# Patient Record
Sex: FEMALE | Race: WHITE
[De-identification: names, ages, dates, MRNs, and addresses within clinical notes are randomized per-mention and may not be internally consistent; named-entity substitution may affect disease eponyms.]

---

## 2018-12-20 ENCOUNTER — HOSPITAL ENCOUNTER (EMERGENCY)
Dept: HOSPITAL 84 - D.ER | Age: 58
LOS: 1 days | Discharge: HOME | End: 2018-12-21
Payer: MEDICAID

## 2018-12-20 VITALS — DIASTOLIC BLOOD PRESSURE: 65 MMHG | SYSTOLIC BLOOD PRESSURE: 116 MMHG

## 2018-12-20 VITALS — HEIGHT: 62 IN | WEIGHT: 204.43 LBS | BODY MASS INDEX: 37.62 KG/M2

## 2018-12-20 DIAGNOSIS — E11.9: ICD-10-CM

## 2018-12-20 DIAGNOSIS — G40.909: ICD-10-CM

## 2018-12-20 DIAGNOSIS — E87.6: ICD-10-CM

## 2018-12-20 DIAGNOSIS — F41.9: ICD-10-CM

## 2018-12-20 DIAGNOSIS — E03.9: ICD-10-CM

## 2018-12-20 DIAGNOSIS — N39.0: ICD-10-CM

## 2018-12-20 DIAGNOSIS — F17.200: ICD-10-CM

## 2018-12-20 DIAGNOSIS — J44.9: ICD-10-CM

## 2018-12-20 DIAGNOSIS — R45.851: Primary | ICD-10-CM

## 2018-12-21 LAB
ALBUMIN SERPL-MCNC: 3.7 G/DL (ref 3.4–5)
ALP SERPL-CCNC: 147 U/L (ref 46–116)
ALT SERPL-CCNC: 45 U/L (ref 10–68)
AMPHETAMINES UR QL SCN: NEGATIVE QUAL
ANION GAP SERPL CALC-SCNC: 14.1 MMOL/L (ref 8–16)
APAP SERPL-MCNC: 0 UG/ML (ref 10–30)
APPEARANCE UR: (no result)
BACTERIA #/AREA URNS HPF: (no result) /HPF
BARBITURATES UR QL SCN: NEGATIVE QUAL
BASOPHILS NFR BLD AUTO: 0.3 % (ref 0–2)
BENZODIAZ UR QL SCN: POSITIVE QUAL
BILIRUB SERPL-MCNC: 0.37 MG/DL (ref 0.2–1.3)
BILIRUB SERPL-MCNC: NEGATIVE MG/DL
BUN SERPL-MCNC: 11 MG/DL (ref 7–18)
BZE UR QL SCN: NEGATIVE QUAL
CALCIUM SERPL-MCNC: 9.1 MG/DL (ref 8.5–10.1)
CANNABINOIDS UR QL SCN: NEGATIVE QUAL
CHLORIDE SERPL-SCNC: 96 MMOL/L (ref 98–107)
CO2 SERPL-SCNC: 29.5 MMOL/L (ref 21–32)
COLOR UR: YELLOW
CREAT SERPL-MCNC: 1 MG/DL (ref 0.6–1.3)
EOSINOPHIL NFR BLD: 0.8 % (ref 0–7)
ERYTHROCYTE [DISTWIDTH] IN BLOOD BY AUTOMATED COUNT: 13.3 % (ref 11.5–14.5)
ETHANOL SERPL-MCNC: 1 MG/DL (ref 0–10)
GLOBULIN SER-MCNC: 4.2 G/L
GLUCOSE SERPL-MCNC: 99 MG/DL (ref 74–106)
GLUCOSE SERPL-MCNC: NEGATIVE MG/DL
HCT VFR BLD CALC: 41.8 % (ref 36–48)
HGB BLD-MCNC: 14.4 G/DL (ref 12–16)
IMM GRANULOCYTES NFR BLD: 0.2 % (ref 0–5)
KETONES UR STRIP-MCNC: NEGATIVE MG/DL
LYMPHOCYTES NFR BLD AUTO: 31.3 % (ref 15–50)
MCH RBC QN AUTO: 32.8 PG (ref 26–34)
MCHC RBC AUTO-ENTMCNC: 34.4 G/DL (ref 31–37)
MCV RBC: 95.2 FL (ref 80–100)
MONOCYTES NFR BLD: 4.9 % (ref 2–11)
NEUTROPHILS NFR BLD AUTO: 62.5 % (ref 40–80)
NITRITE UR-MCNC: POSITIVE MG/ML
OPIATES UR QL SCN: POSITIVE QUAL
OSMOLALITY SERPL CALC.SUM OF ELEC: 272 MOSM/KG (ref 275–300)
PCP UR QL SCN: NEGATIVE QUAL
PH UR STRIP: 7 [PH] (ref 5–6)
PLATELET # BLD: 270 10X3/UL (ref 130–400)
PMV BLD AUTO: 9.8 FL (ref 7.4–10.4)
POTASSIUM SERPL-SCNC: 2.6 MMOL/L (ref 3.5–5.1)
PROT SERPL-MCNC: 7.9 G/DL (ref 6.4–8.2)
PROT UR-MCNC: NEGATIVE MG/DL
RBC # BLD AUTO: 4.39 10X6/UL (ref 4–5.4)
RBC #/AREA URNS HPF: (no result) /HPF (ref 0–5)
SODIUM SERPL-SCNC: 137 MMOL/L (ref 136–145)
SP GR UR STRIP: 1.01 (ref 1–1.02)
SQUAMOUS #/AREA URNS HPF: (no result) /HPF (ref 0–5)
TSH SERPL-ACNC: 7.78 UIU/ML (ref 0.36–3.74)
UROBILINOGEN UR-MCNC: NORMAL MG/DL
WBC # BLD AUTO: 10.8 10X3/UL (ref 4.8–10.8)

## 2020-01-24 ENCOUNTER — HOSPITAL ENCOUNTER (INPATIENT)
Dept: HOSPITAL 84 - D.ER | Age: 60
LOS: 4 days | Discharge: LEFT BEFORE DISCHARGE | DRG: 193 | End: 2020-01-28
Attending: FAMILY MEDICINE | Admitting: FAMILY MEDICINE
Payer: MEDICAID

## 2020-01-24 VITALS
BODY MASS INDEX: 34.33 KG/M2 | WEIGHT: 186.58 LBS | HEIGHT: 62 IN | HEIGHT: 62 IN | WEIGHT: 186.58 LBS | BODY MASS INDEX: 34.33 KG/M2

## 2020-01-24 DIAGNOSIS — J10.1: Primary | ICD-10-CM

## 2020-01-24 DIAGNOSIS — R01.1: ICD-10-CM

## 2020-01-24 DIAGNOSIS — F41.9: ICD-10-CM

## 2020-01-24 DIAGNOSIS — E87.6: ICD-10-CM

## 2020-01-24 DIAGNOSIS — D53.9: ICD-10-CM

## 2020-01-24 DIAGNOSIS — G40.909: ICD-10-CM

## 2020-01-24 DIAGNOSIS — E03.9: ICD-10-CM

## 2020-01-24 DIAGNOSIS — N39.0: ICD-10-CM

## 2020-01-24 DIAGNOSIS — J44.0: ICD-10-CM

## 2020-01-24 DIAGNOSIS — K21.9: ICD-10-CM

## 2020-01-24 DIAGNOSIS — E11.65: ICD-10-CM

## 2020-01-24 DIAGNOSIS — F17.203: ICD-10-CM

## 2020-01-24 DIAGNOSIS — J96.21: ICD-10-CM

## 2020-01-24 DIAGNOSIS — L30.9: ICD-10-CM

## 2020-01-24 DIAGNOSIS — M19.90: ICD-10-CM

## 2020-01-24 DIAGNOSIS — J44.1: ICD-10-CM

## 2020-01-24 DIAGNOSIS — Z91.19: ICD-10-CM

## 2020-01-24 DIAGNOSIS — J20.9: ICD-10-CM

## 2020-01-24 LAB
ALBUMIN SERPL-MCNC: 3.6 G/DL (ref 3.4–5)
ALP SERPL-CCNC: 127 U/L (ref 30–120)
ALT SERPL-CCNC: 28 U/L (ref 10–68)
ANION GAP SERPL CALC-SCNC: 8 MMOL/L (ref 8–16)
APTT BLD: 29.5 SECONDS (ref 22.8–39.4)
BASOPHILS NFR BLD AUTO: 0.1 % (ref 0–2)
BILIRUB SERPL-MCNC: 0.3 MG/DL (ref 0.2–1.3)
BUN SERPL-MCNC: 8 MG/DL (ref 7–18)
CALCIUM SERPL-MCNC: 8.6 MG/DL (ref 8.5–10.1)
CHLORIDE SERPL-SCNC: 106 MMOL/L (ref 98–107)
CK MB SERPL-MCNC: 2.8 U/L (ref 0–3.6)
CK SERPL-CCNC: 495 UL (ref 21–215)
CO2 SERPL-SCNC: 35.2 MMOL/L (ref 21–32)
CREAT SERPL-MCNC: 1.2 MG/DL (ref 0.6–1.3)
EOSINOPHIL NFR BLD: 1.1 % (ref 0–7)
ERYTHROCYTE [DISTWIDTH] IN BLOOD BY AUTOMATED COUNT: 14.3 % (ref 11.5–14.5)
GLOBULIN SER-MCNC: 4 G/L
GLUCOSE SERPL-MCNC: 86 MG/DL (ref 74–106)
HCT VFR BLD CALC: 43.3 % (ref 36–48)
HGB BLD-MCNC: 14.3 G/DL (ref 12–16)
IMM GRANULOCYTES NFR BLD: 0.1 % (ref 0–5)
INR PPP: 1.05 (ref 0.85–1.17)
LYMPHOCYTES NFR BLD AUTO: 39.7 % (ref 15–50)
MCH RBC QN AUTO: 33.7 PG (ref 26–34)
MCHC RBC AUTO-ENTMCNC: 33 G/DL (ref 31–37)
MCV RBC: 102.1 FL (ref 80–100)
MONOCYTES NFR BLD: 4 % (ref 2–11)
NEUTROPHILS NFR BLD AUTO: 55 % (ref 40–80)
NT-PROBNP SERPL-MCNC: 103 PG/ML (ref 0–125)
OSMOLALITY SERPL CALC.SUM OF ELEC: 287 MOSM/KG (ref 275–300)
PLATELET # BLD: 188 10X3/UL (ref 130–400)
PMV BLD AUTO: 9.3 FL (ref 7.4–10.4)
POTASSIUM SERPL-SCNC: 3.2 MMOL/L (ref 3.5–5.1)
PROT SERPL-MCNC: 7.6 G/DL (ref 6.4–8.2)
PROTHROMBIN TIME: 13.7 SECONDS (ref 11.6–15)
RBC # BLD AUTO: 4.24 10X6/UL (ref 4–5.4)
SODIUM SERPL-SCNC: 146 MMOL/L (ref 136–145)
TROPONIN I SERPL-MCNC: < 0.017 NG/ML (ref 0–0.06)
WBC # BLD AUTO: 7.2 10X3/UL (ref 4.8–10.8)

## 2020-01-25 VITALS — SYSTOLIC BLOOD PRESSURE: 116 MMHG | DIASTOLIC BLOOD PRESSURE: 65 MMHG

## 2020-01-25 VITALS — SYSTOLIC BLOOD PRESSURE: 115 MMHG | DIASTOLIC BLOOD PRESSURE: 56 MMHG

## 2020-01-25 VITALS — DIASTOLIC BLOOD PRESSURE: 45 MMHG | SYSTOLIC BLOOD PRESSURE: 99 MMHG

## 2020-01-25 VITALS — SYSTOLIC BLOOD PRESSURE: 114 MMHG | DIASTOLIC BLOOD PRESSURE: 57 MMHG

## 2020-01-25 VITALS — DIASTOLIC BLOOD PRESSURE: 75 MMHG | SYSTOLIC BLOOD PRESSURE: 124 MMHG

## 2020-01-25 VITALS — DIASTOLIC BLOOD PRESSURE: 59 MMHG | SYSTOLIC BLOOD PRESSURE: 110 MMHG

## 2020-01-25 VITALS — DIASTOLIC BLOOD PRESSURE: 56 MMHG | SYSTOLIC BLOOD PRESSURE: 115 MMHG

## 2020-01-25 LAB
ANION GAP SERPL CALC-SCNC: 10.6 MMOL/L (ref 8–16)
APPEARANCE UR: (no result)
BACTERIA #/AREA URNS HPF: (no result) /HPF
BASOPHILS NFR BLD AUTO: 0.1 % (ref 0–2)
BILIRUB SERPL-MCNC: NEGATIVE MG/DL
BUN SERPL-MCNC: 6 MG/DL (ref 7–18)
CALCIUM SERPL-MCNC: 7.7 MG/DL (ref 8.5–10.1)
CHLORIDE SERPL-SCNC: 108 MMOL/L (ref 98–107)
CO2 SERPL-SCNC: 29.3 MMOL/L (ref 21–32)
COLOR UR: YELLOW
CREAT SERPL-MCNC: 1 MG/DL (ref 0.6–1.3)
EOSINOPHIL NFR BLD: 0.9 % (ref 0–7)
ERYTHROCYTE [DISTWIDTH] IN BLOOD BY AUTOMATED COUNT: 14.5 % (ref 11.5–14.5)
GLUCOSE SERPL-MCNC: 74 MG/DL (ref 74–106)
GLUCOSE SERPL-MCNC: NEGATIVE MG/DL
HCT VFR BLD CALC: 35.6 % (ref 36–48)
HGB BLD-MCNC: 11.6 G/DL (ref 12–16)
IMM GRANULOCYTES NFR BLD: 0.1 % (ref 0–5)
KETONES UR STRIP-MCNC: (no result) MG/DL
LYMPHOCYTES NFR BLD AUTO: 40.4 % (ref 15–50)
MAGNESIUM SERPL-MCNC: 1.9 MG/DL (ref 1.8–2.4)
MCH RBC QN AUTO: 33.2 PG (ref 26–34)
MCHC RBC AUTO-ENTMCNC: 32.6 G/DL (ref 31–37)
MCV RBC: 102 FL (ref 80–100)
MONOCYTES NFR BLD: 4.4 % (ref 2–11)
NEUTROPHILS NFR BLD AUTO: 54.1 % (ref 40–80)
NITRITE UR-MCNC: POSITIVE MG/ML
OSMOLALITY SERPL CALC.SUM OF ELEC: 285 MOSM/KG (ref 275–300)
PH UR STRIP: 7 [PH] (ref 5–6)
PHOSPHATE SERPL-MCNC: 3.1 MG/DL (ref 2.5–4.9)
PLATELET # BLD: 180 10X3/UL (ref 130–400)
PMV BLD AUTO: 9.6 FL (ref 7.4–10.4)
POTASSIUM SERPL-SCNC: 2.9 MMOL/L (ref 3.5–5.1)
PROT UR-MCNC: NEGATIVE MG/DL
RBC # BLD AUTO: 3.49 10X6/UL (ref 4–5.4)
RBC #/AREA URNS HPF: (no result) /HPF (ref 0–5)
SODIUM SERPL-SCNC: 145 MMOL/L (ref 136–145)
SP GR UR STRIP: 1.01 (ref 1–1.02)
SQUAMOUS #/AREA URNS HPF: (no result) /HPF (ref 0–5)
UROBILINOGEN UR-MCNC: NORMAL MG/DL
WBC # BLD AUTO: 7.8 10X3/UL (ref 4.8–10.8)
WBC #/AREA URNS HPF: (no result) /HPF

## 2020-01-25 NOTE — NUR
BEDSIDE REPORT RECEIVED. PATIENT RESTING WITH EYES CLOSED. UNLABORED
RESPIRATIONS AND O2 @ 2 L. NO DISTRESS NOTED AT THIS TIME. CPOC.

## 2020-01-25 NOTE — NUR
PT IS RESTING IN BED WITH EYES OPEN. RESPIRATIONS ARE EVEN AND UNLABORED. PT
IS AAO X 4. PT STATES THAT SHE WOULD BATHE IN ALCOHOL AND SOAKS SOCKS IN
ALCOHOL AND PUT THEM ON WET DUE TO "I HAVE THIS ISSUE WITH BUGS". DROPLET
PRECAUTIONS IN PLACE. PUREWICK IN PLACE. PT REPORTS THAT SHE IS INCONTINENT OF
BOWEL AND BLADDER. SCDS ARE ON BLE. O2 VIA NC @ 2L. PT REPORTS THAT SHE DOES
WEAR O2 AT HOME. BED IS IN THE LOWEST POSITION. CALL LIGHT AND BEDSIDE TABLE
ARE WITHIN REACH. SIDE RAILS X 2. FAMILY AT BEDSIDE. PT DENIES FURTHER NEEDS.
WILL CONT TO MONITOR.

## 2020-01-25 NOTE — NUR
resived patient from er . Alert and orented able to voice needs and wants to
staff. Daughter with her. She staed that she is whellchair bound at
home.incont of bowel and blader. uses pullups. Daughter stated that  pt.
bathes in alchol to the point that she will wet her scoks and ring them out
and place them on her feet wet. Bouth she and pt. stated she even put it on
her pullup before she puts it on. skin in dry with some scally areas between
legs and 4 small open marissa on her buttom right side. no dranage noted. but
some yellow covering some of the open areas. pt stated that she put alcohol
all over her body.  Educated pt that that was not good for her skin and being
a diabetic it is very important to keep her skin healthy. She stated that she
hadnot had a shower in the shower in a [facility]. Offered a shower , refused she
and her daughted, stated
that the daughter carlito do it in am. skin is over all clean
looking with dead skin noted to be like you rubed your hand on it and some
dead skin came off.  Stated she did have home health but they don't come any
more becouse of her dogs (they have 3 pit bulls). Patient stated that her
husben is sick that he has early alzheimers.
ua collected via in and out cath per protocol,  Respiatory culture collected,
Influeza A&B swab collected and sent to lab. Positive forInflueza A, placed on
droplet isolatin per protocol.
a

## 2020-01-25 NOTE — NUR
ADMINISTERED HS MEDICATIONS. DENIES FURTHER NEEDS AT THIS TIME. DROPLET
PRECAUTIONS REMAIN IN PLACE. CALL LIGHT CLOSE. CPOC.

## 2020-01-26 VITALS — DIASTOLIC BLOOD PRESSURE: 70 MMHG | SYSTOLIC BLOOD PRESSURE: 137 MMHG

## 2020-01-26 VITALS — SYSTOLIC BLOOD PRESSURE: 99 MMHG | DIASTOLIC BLOOD PRESSURE: 50 MMHG

## 2020-01-26 VITALS — DIASTOLIC BLOOD PRESSURE: 61 MMHG | SYSTOLIC BLOOD PRESSURE: 121 MMHG

## 2020-01-26 VITALS — DIASTOLIC BLOOD PRESSURE: 71 MMHG | SYSTOLIC BLOOD PRESSURE: 119 MMHG

## 2020-01-26 VITALS — SYSTOLIC BLOOD PRESSURE: 136 MMHG | DIASTOLIC BLOOD PRESSURE: 65 MMHG

## 2020-01-26 VITALS — DIASTOLIC BLOOD PRESSURE: 89 MMHG | SYSTOLIC BLOOD PRESSURE: 106 MMHG

## 2020-01-26 LAB
ANION GAP SERPL CALC-SCNC: 9.6 MMOL/L (ref 8–16)
BASOPHILS NFR BLD AUTO: 0.2 % (ref 0–2)
BUN SERPL-MCNC: 7 MG/DL (ref 7–18)
CALCIUM SERPL-MCNC: 7.8 MG/DL (ref 8.5–10.1)
CHLORIDE SERPL-SCNC: 111 MMOL/L (ref 98–107)
CO2 SERPL-SCNC: 28.9 MMOL/L (ref 21–32)
CREAT SERPL-MCNC: 1 MG/DL (ref 0.6–1.3)
EOSINOPHIL NFR BLD: 1.8 % (ref 0–7)
ERYTHROCYTE [DISTWIDTH] IN BLOOD BY AUTOMATED COUNT: 15 % (ref 11.5–14.5)
GLUCOSE SERPL-MCNC: 88 MG/DL (ref 74–106)
HCT VFR BLD CALC: 33.4 % (ref 36–48)
HGB BLD-MCNC: 10.8 G/DL (ref 12–16)
IMM GRANULOCYTES NFR BLD: 0.3 % (ref 0–5)
LYMPHOCYTES NFR BLD AUTO: 40.1 % (ref 15–50)
MAGNESIUM SERPL-MCNC: 2.2 MG/DL (ref 1.8–2.4)
MCH RBC QN AUTO: 33.6 PG (ref 26–34)
MCHC RBC AUTO-ENTMCNC: 32.3 G/DL (ref 31–37)
MCV RBC: 104 FL (ref 80–100)
MONOCYTES NFR BLD: 4.5 % (ref 2–11)
NEUTROPHILS NFR BLD AUTO: 53.1 % (ref 40–80)
OSMOLALITY SERPL CALC.SUM OF ELEC: 287 MOSM/KG (ref 275–300)
PHOSPHATE SERPL-MCNC: 3.6 MG/DL (ref 2.5–4.9)
PLATELET # BLD: 175 10X3/UL (ref 130–400)
PMV BLD AUTO: 9.2 FL (ref 7.4–10.4)
POTASSIUM SERPL-SCNC: 3.5 MMOL/L (ref 3.5–5.1)
RBC # BLD AUTO: 3.21 10X6/UL (ref 4–5.4)
SODIUM SERPL-SCNC: 146 MMOL/L (ref 136–145)
WBC # BLD AUTO: 6.1 10X3/UL (ref 4.8–10.8)

## 2020-01-26 NOTE — NUR
GAVE PATIENT FLUTTER VALVE AS ORDERED BY DR. PRICE. PROVIDED INSTRUCTION AND
PATIENT PROVIDED RETURN DEMONSTRATION. DENIES FURTHER NEEDS AT THIS TIME. CALL
LIGHT IN REACH. CPOC.

## 2020-01-26 NOTE — NUR
PT RESTING IN BED. NO SIGNS OF DISTRESS. IV LEFT FORARM PATENT NO REDNESS OR
TENDERNESS. ON TELEMETRY 77 SR. FALL PRECAUTIONS IN PLACE. DENIES ANY FURTHER
NEED AT THIS TIME. CALL LIGHT IN REACH. BED LOW POSITION. NO FAMILY AT
BEDSIDE.

## 2020-01-26 NOTE — NUR
PATIENT TEARFUL DURING HS MED PASS. ASKED "IS EVERYONE MAD AT ME?" THIS NURSE
ASKED "NO, WHY WOULD YOU THINK THAT?" PATIENT REPLIED "BECAUSE THE CNA PUT MY
SCD'S ON WRONG." ASSESSED SCD'S AND THEY WERE FITTED AND POSITIONED CORRECTLY.
ASKED PATIENT IF SOMEONE HAD SAID SOMETHING TO HER THAT BOTHERED HER SHE
REPLIED "NO I JUST HAVE A LOT GOING ON." PATIENT WENT INTO DETAIL ABOUT
PROBLEMS AT HOME WITH  THAT HAS "BRAIN DISEASE" AND THAT HE DOES NOT
HELP. SHE ALSO STATES "HE IS BIPOLAR AND SCHIZO WITH ONE KIDNEY. HE DOESN'T
HELP ME AT ALL." SHE STATES SHE HAS CHILDREN AND SISTERS THAT DO NOT HELP.
THIS NURSE ASKED IF SHE HAS HOME HEALTH THAT CAN COME HELP HER AT HOME AND
PATIENT REPLIED "NO, NO ONE COMES BECAUSE I HAVE THREE INSIDE PITBULLS." THIS
NURSE ASKED "WHO TAKES CARE OF THE PITBULLS?" PATIENT REPLIED "I DO AS BEST AS
I CAN." SPOKE WITH PATIENT ABOUT HER WELL BEING AFTER DISCHARGE. PATIENT
ADMANT THAT THE DOGS WILL REMAIN IN HER HOME EVEN IF HER HEALTH COULD
POTENTIALLY BE COMPROMISED. PROVIDED SUPPORT AND UNDERSTANDING TO PATIENT.
ASKED IF SHE WOULD LIKE A CONSULT WITH CASE MANAGEMENT AND THE PATIENT REPLIED
"THERES NO POINT BECAUSE THEY WON'T COME OUT BECAUSE OF THE DOGS." PATIENT IN
BETTER SPIRITS PRIOR TO LEAVING THE ROOM. DENIES FURTHER NEEDS AT THIS TIME.
CALL LIGHT IN REACH. SCD'S ON. PUREWICK IN PLACE. CPPOC.

## 2020-01-27 VITALS — SYSTOLIC BLOOD PRESSURE: 118 MMHG | DIASTOLIC BLOOD PRESSURE: 65 MMHG

## 2020-01-27 VITALS — DIASTOLIC BLOOD PRESSURE: 71 MMHG | SYSTOLIC BLOOD PRESSURE: 141 MMHG

## 2020-01-27 VITALS — SYSTOLIC BLOOD PRESSURE: 140 MMHG | DIASTOLIC BLOOD PRESSURE: 78 MMHG

## 2020-01-27 VITALS — SYSTOLIC BLOOD PRESSURE: 119 MMHG | DIASTOLIC BLOOD PRESSURE: 60 MMHG

## 2020-01-27 VITALS — SYSTOLIC BLOOD PRESSURE: 115 MMHG | DIASTOLIC BLOOD PRESSURE: 55 MMHG

## 2020-01-27 VITALS — SYSTOLIC BLOOD PRESSURE: 130 MMHG | DIASTOLIC BLOOD PRESSURE: 68 MMHG

## 2020-01-27 LAB
ANION GAP SERPL CALC-SCNC: 9.7 MMOL/L (ref 8–16)
BASOPHILS NFR BLD AUTO: 0 % (ref 0–2)
BUN SERPL-MCNC: 10 MG/DL (ref 7–18)
CALCIUM SERPL-MCNC: 8.1 MG/DL (ref 8.5–10.1)
CHLORIDE SERPL-SCNC: 108 MMOL/L (ref 98–107)
CO2 SERPL-SCNC: 29.7 MMOL/L (ref 21–32)
CREAT SERPL-MCNC: 0.8 MG/DL (ref 0.6–1.3)
EOSINOPHIL NFR BLD: 1.8 % (ref 0–7)
ERYTHROCYTE [DISTWIDTH] IN BLOOD BY AUTOMATED COUNT: 14.7 % (ref 11.5–14.5)
GLUCOSE SERPL-MCNC: 85 MG/DL (ref 74–106)
HCT VFR BLD CALC: 35.3 % (ref 36–48)
HGB BLD-MCNC: 11.3 G/DL (ref 12–16)
IMM GRANULOCYTES NFR BLD: 0.3 % (ref 0–5)
LYMPHOCYTES NFR BLD AUTO: 36.5 % (ref 15–50)
MAGNESIUM SERPL-MCNC: 2 MG/DL (ref 1.8–2.4)
MCH RBC QN AUTO: 33 PG (ref 26–34)
MCHC RBC AUTO-ENTMCNC: 32 G/DL (ref 31–37)
MCV RBC: 103.2 FL (ref 80–100)
MONOCYTES NFR BLD: 4.4 % (ref 2–11)
NEUTROPHILS NFR BLD AUTO: 57 % (ref 40–80)
OSMOLALITY SERPL CALC.SUM OF ELEC: 284 MOSM/KG (ref 275–300)
PHOSPHATE SERPL-MCNC: 2.4 MG/DL (ref 2.5–4.9)
PLATELET # BLD: 196 10X3/UL (ref 130–400)
PMV BLD AUTO: 9.5 FL (ref 7.4–10.4)
POTASSIUM SERPL-SCNC: 3.4 MMOL/L (ref 3.5–5.1)
RBC # BLD AUTO: 3.42 10X6/UL (ref 4–5.4)
SODIUM SERPL-SCNC: 144 MMOL/L (ref 136–145)
WBC # BLD AUTO: 7.3 10X3/UL (ref 4.8–10.8)

## 2020-01-27 NOTE — NUR
PATIENT IN BED AND COMFORTABLE. IV INTACT. STATED SHE LOVES EVERYBODY AND THAT
SHE DOESNT WANT TO BE MEAN. EXPLAINED TO PATIENT THAT WE UNDERSTAND HER BEING
FRUSTRATED ABOUT HER MEDS AND NOT TO BE STRESSED ABOUT IT. VERBALIZED
UNDERSTANDING. IV INTACT. CALL LIGHT WITHIN REACH.

## 2020-01-27 NOTE — NUR
IN BED ON PHONE WITH  REQUESTING TO SPEAK TO SUPERVISOR ABOUT GETTING
IV OUT AND GOING HOME, STATES SHE DOES NOT FEEL SAFE BECAUSE WE ARE ALL
VINDICTIVE AND OUT TO GET HER, STATES SHE MIGHT AS WELL [place] [facility]. THIS NURSE AND CHARGE RN ARE ATTEMPTING TO CALM AND
REASON WITH PT. SHE CONTINUES TO TALK TO  BUT IS COMING MORE RATIONAL
WITH WORDS. FINALLY AGREES TO STAY FOR A LITTLE WHILE LONGER AND SEE IF WE CAN
MAKE HER MORE COMFORTABLE WITH BEING IN HOSPITAL AT THIS TIME. SHE IS TEARFUL
AND HYPER VERBAL.

## 2020-01-27 NOTE — NUR
PATIENT CALLED RN INTO ROOM AND STATED SHE NEEDED SOME COFFEE AT THIS TIME.
THEN ASKED IF SHE COULD ASK ME SOMETHING WITHOUT BEING WEIRD, I STATED SURE.
PATIENT ASKED ME IF I WOULD LIKE A NEURONTIN OR KLONOPIN AT THIS TIME.
TOLD PATIENT NO I WOULD NOT. SPOKE WITH JORJE LENNON NURSE MANAGER. STATED TO MAKE
SURE MEDS ARE TAKEN HOME OR LOCKED IN PHARMACY. VERBALIZED UNDERSTANDING.

## 2020-01-27 NOTE — NUR
PATIENT IN BED WITH IV INTACT. PATIENT HOME MED LIST CORRECTED BY PRESCRIPTION
BOTTLES GIVEN BY PATIENT. NOTIFIED PLACIDO. PATIENT UPSET ABOUT DR. FOLEY
NOT ADDRESSING HER MEDS LIKE THEY ARE USUALLY PRESCRIBED. STATED HE WAS RUDE
TO HER. EXPLAINED TO PATIENT THAT MEDS WOULD HAVE TO BE PICKED UP BY FAMILY
AND TAKEN HOME OR SENT TO PHARMACY.

## 2020-01-27 NOTE — NUR
IV RESITED TO LEFT HAND, PT WAS VERY ANIMATED DURING PROCESS, ASKED IF I KNEW
SHE WAS AN EPILEPTIC AND STARTED APPEARING TO SHAKE UPPER BODY WHILE TALKING
TO THIS NURSE, I ASKED HER WHAT WOULD MAKE HER SAY THAT AND SHE STATED THAT
PAIN BROUGHT ON HER SEIZURES, I THEN TOLD HER THE IV WAS ALREADY PLACED AND
SHE SHOULDN'T HAVE ANY PAIN IN THAT AREA, SHE STOPPED APPEARING TO SHAKE AND
SAID THAT SHE HAD NEVER HAD AN IV THERE BEFORE AND WASN'T SURE WHAT TO EXPECT.
HER CONVERSATIONS WERE RAPID, EUPHORIC, DISORGANIZED AND RAMBLING. SHE THEN
SAID THAT WHILE SHE WAS WATCHING TITANIC EARLIER AND A VOICE TOLD HER SHE
SHOULD WATCH IT WITH HER HEADPHONES, I EXPLAINED TO HER THAT AT THIS TIME WE
COULD NOT GET HER ANY HEADPHONES THAT ADAPT TO THE TV. SHE THEN BEGAN TALKING
ABOUT HER CHILDREN AND THEIR ISSUES, THIS NURSE OFFERED SUPPORT AND EXITED THE
ROOM WITH PT WATCHING TV.

## 2020-01-27 NOTE — NUR
REPEATEDLY PULLING OFF TELEMETRY LEADS. FINALLY DCD FROM PATIENT. PULLED IV
OUT, WILL RESITE. HAVING AUDITORY HALLUCINATIONS, STATING WE ARE TALKING ABOUT
HER AND GRIPING ABOUT HOW EXPENSIVE SHE IS. ATTEMPTED TO REDIRECT, UNABLE TO.

## 2020-01-27 NOTE — NUR
SPOKE WITH PATIENT'S . ASKING IF SHE IS STILL UPSET. SAID SHE HUNG UP
ON HIM TWICE. EXPLAINED SHE WAS UPSET BECAUSE THE DOCOTOR HAS NOT ORDERED HER
MEDS THE WAY SHE TAKES THEM AT HOME. EXPLAINED I PUT THEM IN THE MED REC
CORRECTLY AND TOLD PLACIDO WHITE. ALSO UPSET BECAUSE SHE THINKS A NURSE AT THE
DESK IS SCOWLING AT HER AND TALKING ABOUT HER. EXPLAINED TO PATIENT THAT THERE
IS NO ONE AT THE DESK THAT IS TALKING ABOUT HER AND THAT WE ONLY DO CHARTING
AT THE DESK SO THE NURSE IS LOOKING AT HER COMPUTER, NOT IN HER ROOM. 
STATED HE UNDERSTOOD AND THAT HE THINKS THERE IS SOMETHING GOING ON WITH HER
MENTAL STATE, THAT MAYBE ITS BECAUSE SHE ISNT GETTING HER MEDS SHE USUALLY
TAKES. EXPLAINED TO  WILL CONTINUE TO MONITOR PATIENT. VERBALIZED
UNDERSTANDING. PATIENT IN BED WITH EYES CLOSED RESTING QUIETLY AT THIS TIME.
CALL LIGHT WITHIN REACH.

## 2020-01-28 VITALS — SYSTOLIC BLOOD PRESSURE: 110 MMHG | DIASTOLIC BLOOD PRESSURE: 65 MMHG

## 2020-01-28 LAB
IGA SERPL-MCNC: 185 MG/DL (ref 87–352)
IGG SERPL-MCNC: 777 MG/DL (ref 700–1600)

## 2020-01-28 NOTE — NUR
ALERTED TO PT ROOM, STAFF TIMES TWO IN ROOM.
PT HAS PULLED HER IV TUBING INTO TWO PIECES, SHE IS
UNSTEADY AND UNABLE TO AMBULATE BY SELF, SHE IS ADAMANT THAT SHE IS GOING TO
WALK TO THE WINDOW IN MAIN HALLWAY AND GET FRESH AIR, THIS NURSE ATTEMPTED TO
REORIENTATE PT TO CONDITION AND NEED OF WHEEL CHAIR FOR AMBULATION AND BEING
ON PRECAUTIONS FOR POSITIVE FLU TEST, SHE SAID SHE WALKS JUST FINE, THIS NURSE
DID NOT PRESS ISSUE, PT STATED SHE IS TIRED OF NURSES NOT COMING AND TALKING
TO HER AND NOT SAYING HI TO HER WHEN THEY WALK PAST HER ROOM, SHE STATES SHE
IS LONELY AND NEEDS US TO BE HER FRIENDS. I ENCOURAGED HER TO CALL HER FAMILY
TO COME AND SIT WITH HER, SHE HAD STATED EARLIER IN SHIFT HER  WOULD
COME, ALL SHE HAD TO DO WAS CALL HIM. SHE THEN STATED THAT THEIR TRUCK WAS
BROKE DOWN. THIS PT HAS SEVERELY DISORGANIZED THOUGHT PROCESSES AND UNABLE TO
BE REDIRECTED. WILL CONTINUE TO OFFER SUPPORT BEST WAY POSSIBLE.

## 2020-01-28 NOTE — NUR
ALERTED THIS NURSE THAT SHE IS HAVING HER SISTER COME AND PICK HER UP AND TAKE
HER TO [facility] TO THE PS[facility]CH [facility]. EXPLAINED ALL RISKS VS BENEFITS OF LEAVING AMA,
SHE STATED SHE UNDERSTOOD. RAMÓN COURTNEY NOTIFIED. ABBY NOTIFIED. WILL WAIT
ON SISTER TO ARRIVE.

## 2020-01-28 NOTE — NUR
PT CALLED POLICE AND STATED WE WERE HOLDING HER AGAINST HER WILL, POLICE
CALLED NURSING STATION AND WE ASSURED POLICE SHE WAS SAFE AND THAT NURSING
STAFF WAS IN ROOM AT THAT TIME. CHARGE NURSE, HOUSE SUPV. NOTIFIED.

## 2020-01-28 NOTE — NUR
SISTER ARRIVED. AMA PAPERS SIGNED. STAFF ASSISTED TO E.R. EXIT VIA WHEEL CHAIR
WITH BELONGINGS. IV REMOVED.

## 2020-01-30 LAB
IGE SERPL-ACNC: 75 IU/ML (ref 6–495)
IGG SERPL-MCNC: 779 MG/DL (ref 700–1600)
IGG1 SER-MCNC: (no result) MG/DL
IGG2 SER-MCNC: (no result) MG/DL
IGG3 SER-MCNC: (no result) MG/DL
IGG4 SER-MCNC: (no result) MG/DL